# Patient Record
Sex: FEMALE | Race: WHITE | NOT HISPANIC OR LATINO
[De-identification: names, ages, dates, MRNs, and addresses within clinical notes are randomized per-mention and may not be internally consistent; named-entity substitution may affect disease eponyms.]

---

## 2017-03-06 ENCOUNTER — RESULT REVIEW (OUTPATIENT)
Age: 57
End: 2017-03-06

## 2017-04-25 ENCOUNTER — APPOINTMENT (OUTPATIENT)
Dept: ORTHOPEDIC SURGERY | Facility: CLINIC | Age: 57
End: 2017-04-25

## 2017-04-25 VITALS
BODY MASS INDEX: 21.6 KG/M2 | HEIGHT: 60 IN | DIASTOLIC BLOOD PRESSURE: 69 MMHG | HEART RATE: 90 BPM | SYSTOLIC BLOOD PRESSURE: 105 MMHG | WEIGHT: 110 LBS

## 2017-04-25 DIAGNOSIS — Z78.9 OTHER SPECIFIED HEALTH STATUS: ICD-10-CM

## 2017-04-25 DIAGNOSIS — S20.219A CONTUSION OF UNSPECIFIED FRONT WALL OF THORAX, INITIAL ENCOUNTER: ICD-10-CM

## 2017-04-25 RX ORDER — CYCLOBENZAPRINE HYDROCHLORIDE 10 MG/1
10 TABLET, FILM COATED ORAL
Qty: 7 | Refills: 0 | Status: ACTIVE | COMMUNITY
Start: 2017-04-24

## 2017-06-12 ENCOUNTER — OUTPATIENT (OUTPATIENT)
Dept: OUTPATIENT SERVICES | Facility: HOSPITAL | Age: 57
LOS: 1 days | End: 2017-06-12
Payer: COMMERCIAL

## 2017-06-12 ENCOUNTER — APPOINTMENT (OUTPATIENT)
Dept: MAMMOGRAPHY | Facility: IMAGING CENTER | Age: 57
End: 2017-06-12

## 2017-06-12 ENCOUNTER — APPOINTMENT (OUTPATIENT)
Dept: RADIOLOGY | Facility: IMAGING CENTER | Age: 57
End: 2017-06-12

## 2017-06-12 DIAGNOSIS — N60.19 DIFFUSE CYSTIC MASTOPATHY OF UNSPECIFIED BREAST: ICD-10-CM

## 2017-06-12 PROCEDURE — 77080 DXA BONE DENSITY AXIAL: CPT

## 2017-06-12 PROCEDURE — 77067 SCR MAMMO BI INCL CAD: CPT

## 2017-06-12 PROCEDURE — 77063 BREAST TOMOSYNTHESIS BI: CPT

## 2018-06-05 ENCOUNTER — RESULT REVIEW (OUTPATIENT)
Age: 58
End: 2018-06-05

## 2018-06-14 ENCOUNTER — APPOINTMENT (OUTPATIENT)
Dept: MAMMOGRAPHY | Facility: IMAGING CENTER | Age: 58
End: 2018-06-14

## 2018-07-12 ENCOUNTER — EMERGENCY (EMERGENCY)
Facility: HOSPITAL | Age: 58
LOS: 1 days | Discharge: HOME | End: 2018-07-12
Attending: EMERGENCY MEDICINE
Payer: COMMERCIAL

## 2018-07-12 VITALS
RESPIRATION RATE: 22 BRPM | WEIGHT: 104.94 LBS | TEMPERATURE: 98 F | HEIGHT: 60 IN | OXYGEN SATURATION: 100 % | DIASTOLIC BLOOD PRESSURE: 65 MMHG | SYSTOLIC BLOOD PRESSURE: 125 MMHG | HEART RATE: 96 BPM

## 2018-07-12 PROCEDURE — 99284 EMERGENCY DEPT VISIT MOD MDM: CPT

## 2018-07-12 RX ORDER — DIPHENHYDRAMINE HCL 50 MG
1 CAPSULE ORAL
Qty: 15 | Refills: 0 | OUTPATIENT
Start: 2018-07-12 | End: 2018-07-16

## 2018-07-12 RX ORDER — FAMOTIDINE 10 MG/ML
20 INJECTION INTRAVENOUS DAILY
Qty: 0 | Refills: 0 | Status: DISCONTINUED | OUTPATIENT
Start: 2018-07-12 | End: 2018-07-16

## 2018-07-12 RX ORDER — FAMOTIDINE 10 MG/ML
1 INJECTION INTRAVENOUS
Qty: 15 | Refills: 0 | OUTPATIENT
Start: 2018-07-12 | End: 2018-07-26

## 2018-07-12 RX ADMIN — Medication 0.5 MILLIGRAM(S): at 14:52

## 2018-07-12 RX ADMIN — Medication 40 MILLIGRAM(S): at 14:48

## 2018-07-12 RX ADMIN — FAMOTIDINE 20 MILLIGRAM(S): 10 INJECTION INTRAVENOUS at 14:48

## 2018-07-12 NOTE — ED PROVIDER NOTE - ATTENDING CONTRIBUTION TO CARE
58y F hx anxiety here with allergic reaction believes to a new supplement used today, reaction including generalized itching and skin rash to upper extremities relieved by Benadryl. No associated nausea, vomiting, SOB, wheezing, oropharyngeal swelling, difficulty swallowing. Notes hx of anxiety and feels anxious at present. VS WNL. PE benign. Took Benadryl PTA will add Pepcid, steroids. PO Ativan for anxiety sx which I do not believe are related to her allergic reaction and observe. Plan to DC pepcid, benadryl steroids, PCP FU.

## 2018-07-12 NOTE — ED PROVIDER NOTE - OBJECTIVE STATEMENT
pt is a 59yo female with pmhx of anxiety c/o allergic reaction x today. pt reports she used a green power that she never used before in her shake this morning. pt reports acute onset itchy rash without trouble swallowing or sob. pt reports she immediately took benadryl which relieved symptoms. pt reports hx of panic attacks, pt reports she started to have a panic attack after symptoms. pt denies fever, cp, sob, n/v, trouble swallowing.

## 2018-07-12 NOTE — ED PROVIDER NOTE - CHPI ED SYMPTOMS NEG
no difficulty swallowing/no cough/no wheezing/no shortness of breath/no swelling of face, tongue/no vomiting/no difficulty breathing/no nausea/no throat itching

## 2018-07-12 NOTE — ED ADULT TRIAGE NOTE - CHIEF COMPLAINT QUOTE
about 1/2 hr ago, pt reports generalized erythema, burning and itching to her arms, legs and face.  pt took benadryl 25mg at 1345  pt reports having a panic attack

## 2018-07-12 NOTE — ED PROVIDER NOTE - PROGRESS NOTE DETAILS
pt improved. will rx prednisone, pepcid and benadryl. All questions answered and concerns addressed. pt verbalized understanding and agreement with plan and dx. pt advised to follow up with PMD. pt advised to return to ed for worsening symptoms including fever, cp, sob. will dc.

## 2018-07-12 NOTE — ED PROVIDER NOTE - MEDICAL DECISION MAKING DETAILS
prednisone, pepcid and ativan. pt advised to not drink green powder anymore. prednisone, pepcid and ativan. pt advised to not drink green powder anymore.  Turrin: See attending statement below

## 2018-09-06 PROBLEM — F41.9 ANXIETY DISORDER, UNSPECIFIED: Chronic | Status: ACTIVE | Noted: 2018-07-12

## 2018-09-12 ENCOUNTER — APPOINTMENT (OUTPATIENT)
Dept: MAMMOGRAPHY | Facility: IMAGING CENTER | Age: 58
End: 2018-09-12
Payer: COMMERCIAL

## 2018-09-12 ENCOUNTER — OUTPATIENT (OUTPATIENT)
Dept: OUTPATIENT SERVICES | Facility: HOSPITAL | Age: 58
LOS: 1 days | End: 2018-09-12
Payer: COMMERCIAL

## 2018-09-12 DIAGNOSIS — Z00.8 ENCOUNTER FOR OTHER GENERAL EXAMINATION: ICD-10-CM

## 2018-09-12 PROCEDURE — 77067 SCR MAMMO BI INCL CAD: CPT

## 2018-09-12 PROCEDURE — 77067 SCR MAMMO BI INCL CAD: CPT | Mod: 26

## 2018-09-12 PROCEDURE — 77063 BREAST TOMOSYNTHESIS BI: CPT | Mod: 26

## 2018-09-12 PROCEDURE — 77063 BREAST TOMOSYNTHESIS BI: CPT

## 2018-12-10 ENCOUNTER — OUTPATIENT (OUTPATIENT)
Dept: OUTPATIENT SERVICES | Facility: HOSPITAL | Age: 58
LOS: 1 days | End: 2018-12-10
Payer: COMMERCIAL

## 2018-12-10 ENCOUNTER — APPOINTMENT (OUTPATIENT)
Dept: ULTRASOUND IMAGING | Facility: IMAGING CENTER | Age: 58
End: 2018-12-10
Payer: COMMERCIAL

## 2018-12-10 ENCOUNTER — APPOINTMENT (OUTPATIENT)
Dept: RADIOLOGY | Facility: IMAGING CENTER | Age: 58
End: 2018-12-10

## 2018-12-10 ENCOUNTER — APPOINTMENT (OUTPATIENT)
Dept: ULTRASOUND IMAGING | Facility: IMAGING CENTER | Age: 58
End: 2018-12-10

## 2018-12-10 DIAGNOSIS — Z00.8 ENCOUNTER FOR OTHER GENERAL EXAMINATION: ICD-10-CM

## 2018-12-10 PROCEDURE — 76536 US EXAM OF HEAD AND NECK: CPT | Mod: 26

## 2018-12-10 PROCEDURE — 77080 DXA BONE DENSITY AXIAL: CPT | Mod: 26

## 2018-12-10 PROCEDURE — 76536 US EXAM OF HEAD AND NECK: CPT

## 2018-12-10 PROCEDURE — 77080 DXA BONE DENSITY AXIAL: CPT

## 2019-01-23 NOTE — ED ADULT TRIAGE NOTE - WEIGHT IN LBS
Telephone Encounter by Jenna Huynh MD at 05/23/17 09:50 AM     Author:  Jenna Huynh MD Service:  (none) Author Type:  Physician     Filed:  05/23/17 09:51 AM Encounter Date:  4/17/2017 Status:  Signed     :  Jenna Huynh MD (Physician)            Patient did not want refill in office visit - Zarina removed from med list  Please call pharmacy to stop asking for refill requests as I don't believe the request is coming from the patient[[email or web address]]      Revision History        User Key Date/Time User Provider Type Action    > BK1.1 05/23/17 09:51 AM Jenna Huynh MD Physician Sign    M - Manual             104.9

## 2019-02-18 ENCOUNTER — APPOINTMENT (OUTPATIENT)
Dept: ULTRASOUND IMAGING | Facility: IMAGING CENTER | Age: 59
End: 2019-02-18

## 2021-05-04 ENCOUNTER — APPOINTMENT (OUTPATIENT)
Dept: RADIOLOGY | Facility: IMAGING CENTER | Age: 61
End: 2021-05-04

## 2021-05-13 ENCOUNTER — OUTPATIENT (OUTPATIENT)
Dept: OUTPATIENT SERVICES | Facility: HOSPITAL | Age: 61
LOS: 1 days | End: 2021-05-13
Payer: COMMERCIAL

## 2021-05-13 ENCOUNTER — APPOINTMENT (OUTPATIENT)
Dept: RADIOLOGY | Facility: IMAGING CENTER | Age: 61
End: 2021-05-13
Payer: COMMERCIAL

## 2021-05-13 DIAGNOSIS — Z00.8 ENCOUNTER FOR OTHER GENERAL EXAMINATION: ICD-10-CM

## 2021-05-13 PROCEDURE — 77080 DXA BONE DENSITY AXIAL: CPT

## 2021-05-13 PROCEDURE — 77080 DXA BONE DENSITY AXIAL: CPT | Mod: 26

## 2022-08-09 NOTE — ED PROVIDER NOTE - CPE EDP RESP NORM
normal... Qbrexza Counseling:  I discussed with the patient the risks of Qbrexza including but not limited to headache, mydriasis, blurred vision, dry eyes, nasal dryness, dry mouth, dry throat, dry skin, urinary hesitation, and constipation.  Local skin reactions including erythema, burning, stinging, and itching can also occur.

## 2023-08-07 ENCOUNTER — APPOINTMENT (OUTPATIENT)
Dept: COLORECTAL SURGERY | Facility: CLINIC | Age: 63
End: 2023-08-07
Payer: COMMERCIAL

## 2023-08-07 VITALS
SYSTOLIC BLOOD PRESSURE: 121 MMHG | TEMPERATURE: 97.8 F | RESPIRATION RATE: 14 BRPM | WEIGHT: 114 LBS | HEART RATE: 96 BPM | DIASTOLIC BLOOD PRESSURE: 76 MMHG | BODY MASS INDEX: 22.98 KG/M2 | OXYGEN SATURATION: 97 % | HEIGHT: 59 IN

## 2023-08-07 DIAGNOSIS — Z80.0 FAMILY HISTORY OF MALIGNANT NEOPLASM OF DIGESTIVE ORGANS: ICD-10-CM

## 2023-08-07 DIAGNOSIS — K58.9 IRRITABLE BOWEL SYNDROME W/OUT DIARRHEA: ICD-10-CM

## 2023-08-07 DIAGNOSIS — K62.89 OTHER SPECIFIED DISEASES OF ANUS AND RECTUM: ICD-10-CM

## 2023-08-07 PROCEDURE — 99203 OFFICE O/P NEW LOW 30 MIN: CPT

## 2023-08-07 RX ORDER — SODIUM FLUORIDE 6 MG/ML
1.1 PASTE DENTAL
Qty: 100 | Refills: 0 | Status: COMPLETED | COMMUNITY
Start: 2023-03-13

## 2023-08-07 RX ORDER — TRAMADOL HYDROCHLORIDE 50 MG/1
50 TABLET, COATED ORAL
Qty: 90 | Refills: 0 | Status: COMPLETED | COMMUNITY
Start: 2017-04-25 | End: 2023-08-07

## 2023-08-07 RX ORDER — DICYCLOMINE HYDROCHLORIDE 10 MG/1
10 CAPSULE ORAL EVERY 6 HOURS
Qty: 30 | Refills: 2 | Status: ACTIVE | COMMUNITY
Start: 2023-08-07 | End: 1900-01-01

## 2023-08-07 RX ORDER — SODIUM SULFATE, POTASSIUM SULFATE, MAGNESIUM SULFATE 17.5; 3.13; 1.6 G/ML; G/ML; G/ML
17.5-3.13-1.6 SOLUTION, CONCENTRATE ORAL
Qty: 354 | Refills: 0 | Status: COMPLETED | COMMUNITY
Start: 2016-11-23 | End: 2023-08-07

## 2023-08-07 RX ORDER — TRAMADOL HYDROCHLORIDE AND ACETAMINOPHEN 37.5; 325 MG/1; MG/1
37.5-325 TABLET, FILM COATED ORAL
Qty: 42 | Refills: 0 | Status: COMPLETED | COMMUNITY
Start: 2017-04-25 | End: 2023-08-07

## 2023-08-07 NOTE — PHYSICAL EXAM
[Excoriation] : no perianal excoriation [de-identified] : Soft, nontender, nondistended.  No mass or hernias appreciated.  Pfannenstiel incision noted. [de-identified] : Tenderness with CARLA but no mass appreciated.  No obvious anal fissure seen.  Nonthrombosed external hemorrhoid.  Some grade 2 internal hemorrhoids anoscopy was performed but limited given the amount of pain.  The areas visualized are normal with source of the pain.

## 2023-08-07 NOTE — CONSULT LETTER
[Dear  ___] : Dear  [unfilled], [Consult Letter:] : I had the pleasure of evaluating your patient, [unfilled]. [Please see my note below.] : Please see my note below. [Consult Closing:] : Thank you very much for allowing me to participate in the care of this patient.  If you have any questions, please do not hesitate to contact me. [Sincerely,] : Sincerely, [FreeTextEntry3] : Shawn Pickett MD

## 2023-08-07 NOTE — PLAN
[TextEntry] : 63-year-old female with a longstanding history of anal pain.  Her symptoms are more concerning for an anal fissure although was not obviously visualized on exam.  She had significant tenderness on anoscopy.  My recommendation is to treat as an anal fissure with high-fiber diet, sitz bath's and diltiazem with lidocaine.  I also recommend dicyclomine for IBS symptoms.  She will follow-up in 4 weeks.  If her symptoms have not improved, recommend exam under anesthesia with Botox injection versus sphincterotomy.

## 2023-08-07 NOTE — HISTORY OF PRESENT ILLNESS
[FreeTextEntry1] : 63-year-old female presents for consultation for hemorrhoids.  Her symptoms have been present for several years.  She describes it as a pressure sensation in the rectum that usually wakes her up in the morning.  It is followed by the urge to have a bowel movement.  The pain usually resolves after having a bowel movement although there is occasional residual throbbing pain in the area that resolves after a few minutes to hours.  She denies any rectal bleeding.  She has tried suppositories with little to no improvement.  She has also undergone hemorrhoid banding about 2 years ago which did not have a lasting effect.  She has a history of IBS and averages 2-3 bowel movements daily although occasionally has many as 6 depending on diet and stress.  Last colonoscopy was approximately 2 years ago and reportedly normal

## 2023-08-24 ENCOUNTER — OUTPATIENT (OUTPATIENT)
Dept: OUTPATIENT SERVICES | Facility: HOSPITAL | Age: 63
LOS: 1 days | End: 2023-08-24
Payer: COMMERCIAL

## 2023-08-24 ENCOUNTER — APPOINTMENT (OUTPATIENT)
Dept: MAMMOGRAPHY | Facility: IMAGING CENTER | Age: 63
End: 2023-08-24
Payer: COMMERCIAL

## 2023-08-24 ENCOUNTER — APPOINTMENT (OUTPATIENT)
Dept: ULTRASOUND IMAGING | Facility: IMAGING CENTER | Age: 63
End: 2023-08-24
Payer: COMMERCIAL

## 2023-08-24 DIAGNOSIS — Z00.8 ENCOUNTER FOR OTHER GENERAL EXAMINATION: ICD-10-CM

## 2023-08-24 PROCEDURE — 77063 BREAST TOMOSYNTHESIS BI: CPT | Mod: 26

## 2023-08-24 PROCEDURE — 77067 SCR MAMMO BI INCL CAD: CPT | Mod: 26

## 2023-08-24 PROCEDURE — 77063 BREAST TOMOSYNTHESIS BI: CPT

## 2023-08-24 PROCEDURE — 77067 SCR MAMMO BI INCL CAD: CPT

## 2024-03-08 NOTE — ED ADULT NURSE NOTE - NS ED NURSE RECORD ANOTHER HT AND WT
Subjective      Chief Complaint   Patient presents with    Office Visit    Cough    Sore Throat     Pt is here due to sore throat, cough, chest congestion for 1 month now    Congestion        HPI        Patient comes in with cough, congestion, postnasal drip, sore throat worse in the morning, states that they have wheezing and coughing fits that have been going on from time to time  Cough is  productive   denies any fevers or chills    This is been going on for the past 2 weeks       Visit Vitals  /85   Pulse 86   Temp 98.1 °F (36.7 °C) (Temporal)   Resp 18   Ht 5' 4\" (1.626 m)   Wt 78.5 kg (173 lb)   LMP 06/03/2023 (Exact Date)   SpO2 99%   BMI 29.70 kg/m²         Past Medical History:   Diagnosis Date    Carpal tunnel syndrome, bilateral     Hypersomnia     IBS (irritable bowel syndrome)     with diarrhea    Moderate episode of recurrent major depressive disorder (CMD)     PMDD (premenstrual dysphoric disorder)         Current Outpatient Medications   Medication Sig Dispense Refill    sertraline (ZOLOFT) 50 MG tablet Take 1 tablet by mouth daily. (Patient not taking: Reported on 3/8/2024) 90 tablet 3    buPROPion (WELLBUTRIN) 75 MG tablet Take 1 tablet by mouth 2 times daily. (Patient not taking: Reported on 3/8/2024) 60 tablet 1     No current facility-administered medications for this visit.        ALLERGIES:  No Known Allergies     No past surgical history on file.     Family History   Problem Relation Age of Onset    Dementia/Alzheimers Mother     Cancer, Prostate Father         Social History     Socioeconomic History    Marital status: Single     Spouse name: Not on file    Number of children: Not on file    Years of education: Not on file    Highest education level: Not on file   Occupational History    Occupation:    Tobacco Use    Smoking status: Never    Smokeless tobacco: Never   Vaping Use    Vaping Use: never used   Substance and Sexual Activity    Alcohol use: Never      Comment: Former drinker    Drug use: Never    Sexual activity: Yes     Partners: Male     Birth control/protection: None   Other Topics Concern    Not on file   Social History Narrative    Not on file     Social Determinants of Health     Financial Resource Strain: Not on file   Food Insecurity: Not on file   Transportation Needs: Not on file   Physical Activity: Not on file   Stress: Not on file   Social Connections: Not on file   Interpersonal Safety: Not on file          Patient's medications, allergies, past medical, surgical, and social history  were reviewed and updated as appropriate.      Review of systems:  See HPI, all other systems reviewed and are negative.    General appearance: alert, appears stated age and cooperative  Head: Normocephalic, without obvious abnormality, atraumatic  Eyes: conjunctivae/corneas clear. PERRL, EOM's intact. Fundi benign.  Ears: normal TM's and external ear canals both ears  Nose: Nares inflamed, congestion and rhinorrhea noted  Throat: lips, mucosa, and tongue normal; teeth and gums normal, cobblestoning noted in back of throat  Neck: no adenopathy, supple, no bruits  Chest wall: No rib tenderness.  No crepitus.  Lungs: rhonchi heard in both lung fields. no wheezing, crackles.  No accessory muscle use. No respiratory distress. No tachypnea noted. No retractions noted.   Heart: S1, S2 normal, no murmur, click, rub or gallop, regular rate and rhythm  Skin: Skin color, texture, turgor normal. No rashes or lesions  Extremities:  No edema, no cyanosis.      LAB TESTING    Results for orders placed or performed in visit on 03/08/24   POCT SARS-COV-2 ANTIGEN/FLU ANTIGEN PANEL   Result Value Ref Range    POCT SARS-COV-2 ANTIGEN Not Detected Not Detected    Rapid Influenza A Ag Negative Negative    Rapid Influenza B Ag Negative Negative    TEST LOT NUMBER n/a     TEST LOT EXPIRATION DATE n/a    POCT Rapid strep A   Result Value Ref Range    GRP A STREP Negative Negative    Internal  Procedural Controls Acceptable Yes Yes    TEST LOT NUMBER na     TEST LOT EXPIRATION DATE na        Assessment   Problem List Items Addressed This Visit    None  Visit Diagnoses       Sore throat    -  Primary    Relevant Orders    POCT SARS-COV-2 ANTIGEN/FLU ANTIGEN PANEL (Completed)    POCT Rapid strep A (Completed)    Subacute cough        Relevant Orders    XR CHEST PA AND LATERAL 2 VIEWS (Completed)          Discussed imaging results with patient  Chest x-ray interpreted by myself shows no acute abnormalities.      chief complaints of chest congestion, cough and sputum ongoing for the past 14 days.  Patient denies any fever or chills.  Denies any chest pain or shortness of breath on exertion.  Denies any hemoptysis.  No significant fatigue malaise or tiredness.  Denies any COVID-19 exposure.  Patient is fully vaccinated for COVID-19.  History of bronchitis in the past.  No prior history of asthma or pneumonia.  Vitals are stable  No ill appearance.  No acute respiratory distress.  Talking in full sentences.  Oropharynx is within normal limits.  Normal tympanic membrane.  Nasal mucosa is mildly injected.  On exam few scattered rhonchi can be heard in both lungs.  Otherwise lungs are clear to auscultation.  No wheezing or crackles.  No tachypnea.  No tachycardia noted.   NABS.  No organomegaly.  Patient's symptoms appear to be consistent with acute bronchitis.    Differential diagnoses considered in the evaluation of this patient's complaints include: Viral syndrome influenza A/B, pneumonia, upper respiratory infection, sinusitis, bronchitis, reactive airways, asthma.  Most of these conditions can be excluded clinically or by specific testing such as pneumonia or influenza and the most likely cause remains an unknown viral syndrome. Additional, less likely considerations also include: COPD (chronic obstructive pulmonary disease), congestive heart failure, cardiac ischemia, pulmonary edema, pneumothorax, pulmonary  embolism, anemia, airway foreign body, tumor or mass obstructing airflow, and or anxiety.    Plan to treat patient with   Albuterol inhaler prescribed.  Tessalon for cough.  Flonase and saline also prescribed  Steam inhalation.  Push fluids.  Rest.  Follow-up with PCP as directed.      Patient is well appearing, non-toxic, VSS. Recommend follow up with PCP this week, return with new or worsening symptoms. Discussed that the [facility] is not an exhaustive resource and should not take the place of primary care. Go to ED precautions discussed at length.    Patient understands the importance of follow up with PMD.     Additional history from independent historians:     Review of external medical records:  Yes[]   No[]    Independent interpretation of imaging:Yes [] No[]    Management discussions with other healthcare providers: Yes []   No[]    Consideration of social determinants of health and impact on MDM:     Stop medications:Yes []  No[]    Continue medications:Yes []  No[]    Lab test ordered and reviewed:Yes []  No[]    Consideration of hospitalizations:Yes []  No[]      The 21st Century Cures Act makes medical notes like these available to patients in the interest of transparency. However, be advised this is a medical document. It is intended as peer to peer communication. It is written in medical language and may contain abbreviations, jargon, and other verbiage that could be misleading or confusing to lay persons. It may appear blunt or direct. Medical documents are intended to carry relevant information, facts as evident, and the clinical opinion of the medical provider.     This note was made using voice dictation and may include inadvertent errors due to the dictation software. Please contact for clarification regarding any noted discrepancies.   Yes

## 2024-10-28 ENCOUNTER — APPOINTMENT (OUTPATIENT)
Dept: MAMMOGRAPHY | Facility: IMAGING CENTER | Age: 64
End: 2024-10-28

## 2024-11-14 ENCOUNTER — APPOINTMENT (OUTPATIENT)
Dept: MAMMOGRAPHY | Facility: IMAGING CENTER | Age: 64
End: 2024-11-14

## 2024-11-25 ENCOUNTER — OUTPATIENT (OUTPATIENT)
Dept: OUTPATIENT SERVICES | Facility: HOSPITAL | Age: 64
LOS: 1 days | End: 2024-11-25
Payer: COMMERCIAL

## 2024-11-25 ENCOUNTER — APPOINTMENT (OUTPATIENT)
Dept: MAMMOGRAPHY | Facility: IMAGING CENTER | Age: 64
End: 2024-11-25
Payer: COMMERCIAL

## 2024-11-25 DIAGNOSIS — Z00.8 ENCOUNTER FOR OTHER GENERAL EXAMINATION: ICD-10-CM

## 2024-11-25 PROCEDURE — 77067 SCR MAMMO BI INCL CAD: CPT

## 2024-11-25 PROCEDURE — 77067 SCR MAMMO BI INCL CAD: CPT | Mod: 26

## 2024-11-25 PROCEDURE — 77063 BREAST TOMOSYNTHESIS BI: CPT | Mod: 26

## 2024-11-25 PROCEDURE — 77063 BREAST TOMOSYNTHESIS BI: CPT
